# Patient Record
Sex: MALE | Race: WHITE | NOT HISPANIC OR LATINO | URBAN - METROPOLITAN AREA
[De-identification: names, ages, dates, MRNs, and addresses within clinical notes are randomized per-mention and may not be internally consistent; named-entity substitution may affect disease eponyms.]

---

## 2021-02-18 ENCOUNTER — OFFICE VISIT (OUTPATIENT)
Dept: URBAN - METROPOLITAN AREA CLINIC 52 | Facility: CLINIC | Age: 51
End: 2021-02-18

## 2021-04-12 ENCOUNTER — OFFICE VISIT (OUTPATIENT)
Dept: URBAN - METROPOLITAN AREA CLINIC 94 | Facility: CLINIC | Age: 51
End: 2021-04-12
Payer: SELF-PAY

## 2021-04-12 ENCOUNTER — WEB ENCOUNTER (OUTPATIENT)
Dept: URBAN - METROPOLITAN AREA CLINIC 94 | Facility: CLINIC | Age: 51
End: 2021-04-12

## 2021-04-12 ENCOUNTER — LAB OUTSIDE AN ENCOUNTER (OUTPATIENT)
Dept: URBAN - METROPOLITAN AREA CLINIC 94 | Facility: CLINIC | Age: 51
End: 2021-04-12

## 2021-04-12 DIAGNOSIS — K92.1 MELENA: ICD-10-CM

## 2021-04-12 PROCEDURE — 99204 OFFICE O/P NEW MOD 45 MIN: CPT | Performed by: INTERNAL MEDICINE

## 2021-04-12 RX ORDER — ALLOPURINOL 300 MG/1
1 TABLET TABLET ORAL ONCE A DAY
Status: ACTIVE | COMMUNITY

## 2021-04-12 RX ORDER — DICLOFENAC SODIUM 75 MG/1
1 TABLET TABLET, DELAYED RELEASE ORAL TWICE A DAY
Status: ACTIVE | COMMUNITY

## 2021-04-12 RX ORDER — FLECAINIDE ACETATE 100 MG/1
1 TABLET TABLET ORAL TWICE PER DAY
Status: ACTIVE | COMMUNITY

## 2021-04-12 RX ORDER — ASPIRIN 81 MG/1
1 TABLET TABLET, CHEWABLE ORAL ONCE A DAY
Status: ACTIVE | COMMUNITY

## 2021-04-12 RX ORDER — INDOMETHACIN 50 MG/1
1 CAPSULE WITH FOOD OR MILK CAPSULE ORAL TWICE A DAY
Status: ACTIVE | COMMUNITY

## 2021-04-12 RX ORDER — WARFARIN SODIUM 2.5 MG/1
1 TABLET TABLET ORAL ONCE A DAY
Status: DISCONTINUED | COMMUNITY

## 2021-04-12 RX ORDER — OMEPRAZOLE 40 MG/1
1 CAPSULE 30 MINUTES BEFORE MORNING MEAL CAPSULE, DELAYED RELEASE ORAL ONCE A DAY
Status: ACTIVE | COMMUNITY

## 2021-04-12 RX ORDER — TRAMADOL HYDROCHLORIDE 50 MG/1
1 TABLET AS NEEDED TABLET, FILM COATED ORAL EVERY 8 HOURS PRN
Status: ACTIVE | COMMUNITY

## 2021-04-12 RX ORDER — DOCUSATE SODIUM 100 MG/1
1 CAPSULE AS NEEDED CAPSULE, LIQUID FILLED ORAL ONCE A DAY
Status: ACTIVE | COMMUNITY

## 2021-04-12 RX ORDER — RABEPRAZOLE SODIUM 20 MG/1
1 TABLET TABLET, DELAYED RELEASE ORAL ONCE A DAY
Status: ACTIVE | COMMUNITY

## 2021-04-12 RX ORDER — METOPROLOL TARTRATE 100 MG/1
1 TABLET WITH FOOD TABLET, FILM COATED ORAL TWICE A DAY
Status: ACTIVE | COMMUNITY

## 2021-04-12 RX ORDER — LISINOPRIL 5 MG/1
1 TABLET TABLET ORAL ONCE A DAY
Status: ACTIVE | COMMUNITY

## 2021-04-12 NOTE — HPI-TODAY'S VISIT:
Pt presents for evaluation of intermittent melena, rectal bleeding and RLQ pain for last 6 months. Previously evaluated by Dr Jaime Webster from [facility] and underwent a colonoscopy by him in 10/2020 which reportedly showed moderate diverticulosis throughout colon and internal hemorrhoids. Symptoms persisted and pt underwent EGD by Dr Statton from general surgery in 3/2021 which reportedly showed gastritis without H Pylori. Pt is taking aciphex and omeprazole without any improvement. He takes indomethacin and diclofenac for shoulder pain.  Pain descibed as a dull intermittent discomfort localized to RLQ which gets worse with eating. Pain does not radiate. Rectal bleeding described as intermittent in nature. Color varies from bright red to black. Bleeding is intermittent. Last episode of bleeding was 2 weeks ago. Since then, pt eating very small amount of food which seems to be helping with bleeding and pain.  Pt also underwent a small bowel followthrough which showed a single diverticulum in jejunum , otherwise negative.

## 2021-04-13 LAB
A/G RATIO: 1.8
ALBUMIN: 4.4
ALKALINE PHOSPHATASE: 73
ALT (SGPT): 24
AST (SGOT): 21
BASO (ABSOLUTE): 0
BASOS: 1
BILIRUBIN, TOTAL: 0.4
BUN/CREATININE RATIO: 18
BUN: 21
CALCIUM: 9
CARBON DIOXIDE, TOTAL: 20
CHLORIDE: 102
CREATININE: 1.17
EGFR IF AFRICN AM: 82
EGFR IF NONAFRICN AM: 71
EOS (ABSOLUTE): 0.2
EOS: 3
FERRITIN, SERUM: 17
GLOBULIN, TOTAL: 2.5
GLUCOSE: 91
HEMATOCRIT: 38.6
HEMATOLOGY COMMENTS:: (no result)
HEMOGLOBIN: 13.1
IMMATURE CELLS: (no result)
IMMATURE GRANS (ABS): 0
IMMATURE GRANULOCYTES: 0
IRON BIND.CAP.(TIBC): 458
IRON SATURATION: 7
IRON: 33
LYMPHS (ABSOLUTE): 1.3
LYMPHS: 20
MCH: 33.6
MCHC: 33.9
MCV: 99
MONOCYTES(ABSOLUTE): 0.7
MONOCYTES: 12
NEUTROPHILS (ABSOLUTE): 4.1
NEUTROPHILS: 64
NRBC: (no result)
PLATELETS: 154
POTASSIUM: 5.1
PROTEIN, TOTAL: 6.9
RBC: 3.9
RDW: 12.5
SODIUM: 135
UIBC: 425
WBC: 6.4

## 2021-04-26 ENCOUNTER — OFFICE VISIT (OUTPATIENT)
Dept: URBAN - METROPOLITAN AREA CLINIC 93 | Facility: CLINIC | Age: 51
End: 2021-04-26
Payer: SELF-PAY

## 2021-04-26 DIAGNOSIS — D50.9 ANEMIA, IRON DEFICIENCY: ICD-10-CM

## 2021-04-26 PROCEDURE — 91110 GI TRC IMG INTRAL ESOPH-ILE: CPT | Performed by: INTERNAL MEDICINE

## 2021-05-10 ENCOUNTER — OFFICE VISIT (OUTPATIENT)
Dept: URBAN - METROPOLITAN AREA CLINIC 94 | Facility: CLINIC | Age: 51
End: 2021-05-10
Payer: SELF-PAY

## 2021-05-10 DIAGNOSIS — K92.1 MELENA: ICD-10-CM

## 2021-05-10 PROCEDURE — 99214 OFFICE O/P EST MOD 30 MIN: CPT | Performed by: INTERNAL MEDICINE

## 2021-05-10 RX ORDER — OMEPRAZOLE 40 MG/1
1 CAPSULE 30 MINUTES BEFORE MORNING MEAL CAPSULE, DELAYED RELEASE ORAL ONCE A DAY
Status: ACTIVE | COMMUNITY

## 2021-05-10 RX ORDER — ALLOPURINOL 300 MG/1
1 TABLET TABLET ORAL ONCE A DAY
Status: ACTIVE | COMMUNITY

## 2021-05-10 RX ORDER — RABEPRAZOLE SODIUM 20 MG/1
1 TABLET TABLET, DELAYED RELEASE ORAL ONCE A DAY
Status: ACTIVE | COMMUNITY

## 2021-05-10 RX ORDER — LISINOPRIL 5 MG/1
1 TABLET TABLET ORAL ONCE A DAY
Status: ACTIVE | COMMUNITY

## 2021-05-10 RX ORDER — ASPIRIN 81 MG/1
1 TABLET TABLET, CHEWABLE ORAL ONCE A DAY
Status: ACTIVE | COMMUNITY

## 2021-05-10 RX ORDER — DOCUSATE SODIUM 100 MG/1
1 CAPSULE AS NEEDED CAPSULE, LIQUID FILLED ORAL ONCE A DAY
Status: ACTIVE | COMMUNITY

## 2021-05-10 RX ORDER — DICLOFENAC SODIUM 75 MG/1
1 TABLET TABLET, DELAYED RELEASE ORAL TWICE A DAY
Status: ACTIVE | COMMUNITY

## 2021-05-10 RX ORDER — FLECAINIDE ACETATE 100 MG/1
1 TABLET TABLET ORAL TWICE PER DAY
Status: ACTIVE | COMMUNITY

## 2021-05-10 RX ORDER — TRAMADOL HYDROCHLORIDE 50 MG/1
1 TABLET AS NEEDED TABLET, FILM COATED ORAL EVERY 8 HOURS PRN
Status: ACTIVE | COMMUNITY

## 2021-05-10 RX ORDER — METOPROLOL TARTRATE 100 MG/1
1 TABLET WITH FOOD TABLET, FILM COATED ORAL TWICE A DAY
Status: ACTIVE | COMMUNITY

## 2021-05-10 RX ORDER — INDOMETHACIN 50 MG/1
1 CAPSULE WITH FOOD OR MILK CAPSULE ORAL TWICE A DAY
Status: ACTIVE | COMMUNITY

## 2021-05-10 NOTE — HPI-TODAY'S VISIT:
Pt presents for evaluation of intermittent melena, rectal bleeding and RLQ pain for last 6 months. Previously evaluated by Dr Jaime Webster from [facility] and underwent a colonoscopy by him in 10/2020 which reportedly showed moderate diverticulosis throughout colon and internal hemorrhoids. Terminal ileum was not evaluated at that time. Symptoms persisted and pt underwent EGD by Dr Statton from general surgery in 3/2021 which reportedly showed gastritis without H Pylori. Pt was taking aciphex and omeprazole without any improvement. He takes indomethacin and diclofenac for shoulder pain.  Pain descibed as a dull intermittent discomfort localized to RLQ which gets worse with eating. Pain does not radiate. Rectal bleeding described as intermittent in nature. Color varies from bright red to black. Bleeding is intermittent. Pt underwent a small bowel followthrough which showed a single diverticulum in jejunum , otherwise negative. Pt had pill cam which showed severe erosions with inflammation throughout his small bowel. Recent labs showed iron deficieincy with Fe 17 and  normal HGB AT 13.1

## 2021-05-21 ENCOUNTER — OFFICE VISIT (OUTPATIENT)
Dept: URBAN - METROPOLITAN AREA MEDICAL CENTER 34 | Facility: MEDICAL CENTER | Age: 51
End: 2021-05-21
Payer: SELF-PAY

## 2021-05-21 DIAGNOSIS — K29.80 ACUTE DUODENITIS: ICD-10-CM

## 2021-05-21 DIAGNOSIS — K22.8 COLUMNAR-LINED ESOPHAGUS: ICD-10-CM

## 2021-05-21 DIAGNOSIS — D12.5 ADENOMA OF SIGMOID COLON: ICD-10-CM

## 2021-05-21 DIAGNOSIS — K31.89 ACQUIRED DEFORMITY OF DUODENUM: ICD-10-CM

## 2021-05-21 DIAGNOSIS — K92.1 BLACK STOOL: ICD-10-CM

## 2021-05-21 PROCEDURE — 43239 EGD BIOPSY SINGLE/MULTIPLE: CPT | Performed by: INTERNAL MEDICINE

## 2021-05-21 PROCEDURE — 45380 COLONOSCOPY AND BIOPSY: CPT | Performed by: INTERNAL MEDICINE

## 2021-06-09 ENCOUNTER — OFFICE VISIT (OUTPATIENT)
Dept: URBAN - METROPOLITAN AREA CLINIC 94 | Facility: CLINIC | Age: 51
End: 2021-06-09
Payer: SELF-PAY

## 2021-06-09 VITALS
BODY MASS INDEX: 28.71 KG/M2 | TEMPERATURE: 99.1 F | SYSTOLIC BLOOD PRESSURE: 132 MMHG | DIASTOLIC BLOOD PRESSURE: 85 MMHG | HEART RATE: 65 BPM | WEIGHT: 212 LBS | HEIGHT: 72 IN

## 2021-06-09 DIAGNOSIS — K92.1 MELENA: ICD-10-CM

## 2021-06-09 DIAGNOSIS — K57.50 DIVERTICULOSIS OF BOTH SMALL AND LARGE INTESTINE: ICD-10-CM

## 2021-06-09 DIAGNOSIS — K29.80 PEPTIC DUODENITIS: ICD-10-CM

## 2021-06-09 DIAGNOSIS — D50.0 IRON DEFICIENCY ANEMIA DUE TO CHRONIC BLOOD LOSS: ICD-10-CM

## 2021-06-09 PROBLEM — 397881000: Status: ACTIVE | Noted: 2021-06-09

## 2021-06-09 PROCEDURE — 99213 OFFICE O/P EST LOW 20 MIN: CPT | Performed by: INTERNAL MEDICINE

## 2021-06-09 RX ORDER — METOPROLOL TARTRATE 100 MG/1
1 TABLET WITH FOOD TABLET, FILM COATED ORAL TWICE A DAY
Status: ACTIVE | COMMUNITY

## 2021-06-09 RX ORDER — OMEPRAZOLE 40 MG/1
1 CAPSULE 30 MINUTES BEFORE MORNING MEAL CAPSULE, DELAYED RELEASE ORAL ONCE A DAY
Status: ACTIVE | COMMUNITY

## 2021-06-09 RX ORDER — LISINOPRIL 5 MG/1
1 TABLET TABLET ORAL ONCE A DAY
Status: ACTIVE | COMMUNITY

## 2021-06-09 RX ORDER — TRAMADOL HYDROCHLORIDE 50 MG/1
1 TABLET AS NEEDED TABLET, FILM COATED ORAL EVERY 8 HOURS PRN
Status: ACTIVE | COMMUNITY

## 2021-06-09 RX ORDER — DICLOFENAC SODIUM 75 MG/1
1 TABLET TABLET, DELAYED RELEASE ORAL TWICE A DAY
Status: DISCONTINUED | COMMUNITY

## 2021-06-09 RX ORDER — ASPIRIN 81 MG/1
1 TABLET TABLET, CHEWABLE ORAL ONCE A DAY
Status: ACTIVE | COMMUNITY

## 2021-06-09 RX ORDER — ALLOPURINOL 300 MG/1
1 TABLET TABLET ORAL ONCE A DAY
Status: ACTIVE | COMMUNITY

## 2021-06-09 RX ORDER — RABEPRAZOLE SODIUM 20 MG/1
1 TABLET TABLET, DELAYED RELEASE ORAL ONCE A DAY
Status: ACTIVE | COMMUNITY

## 2021-06-09 RX ORDER — DOCUSATE SODIUM 100 MG/1
1 CAPSULE AS NEEDED CAPSULE, LIQUID FILLED ORAL ONCE A DAY
Status: ACTIVE | COMMUNITY

## 2021-06-09 RX ORDER — FLECAINIDE ACETATE 100 MG/1
1 TABLET TABLET ORAL TWICE PER DAY
Status: ACTIVE | COMMUNITY

## 2021-06-09 RX ORDER — INDOMETHACIN 50 MG/1
1 CAPSULE WITH FOOD OR MILK CAPSULE ORAL TWICE A DAY
Status: DISCONTINUED | COMMUNITY

## 2021-06-09 NOTE — HPI-TODAY'S VISIT:
50 yo M returns today for post procedure f/u visit. EGD 5/2021 reveals irregular Z line, stomach and duodenal erythema Bx Peptic Duodenitis Reactive Gastropathy, (-) H. Pylori Colonoscopy normal TI, Multiple small and large mouth diverticula - sigmoid, 5 mm sessile serrated polyp in Sigmoid colon, IH  Pt reports stopping the Diclofenac as directed and continues with the oral iron supplement since his last visit. Pt still reports dark/black stools but is taking oral iron.   Pt had a hx of intermittent melena, rectal bleeding and RLQ pain for last 6 months. Previously evaluated by Dr Jaime Webster from [facility] and underwent a colonoscopy by him in 10/2020 which reportedly showed moderate diverticulosis throughout colon and internal hemorrhoids. Terminal ileum was not evaluated at that time. Symptoms persisted and pt underwent EGD by Dr Statton from general surgery in 3/2021 which reportedly showed gastritis without H Pylori. Pt was taking aciphex and omeprazole without any improvement.  He takes indomethacin and diclofenac for shoulder pain.  Pain descibed as a dull intermittent discomfort localized to RLQ which gets worse with eating. Pain does not radiate. Rectal bleeding described as intermittent in nature. Color varies from bright red to black. Bleeding is intermittent. Pt underwent a small bowel followthrough which showed a single diverticulum in jejunum , otherwise negative.  Pill cam which showed severe erosions with inflammation throughout his small bowel. Recent labs showed iron deficieincy with Fe 17 and  normal HGB

## 2021-06-10 LAB
BASO (ABSOLUTE): 0.1
BASOS: 1
EOS (ABSOLUTE): 0.1
EOS: 1
FERRITIN, SERUM: 35
HEMATOCRIT: 46.9
HEMATOLOGY COMMENTS:: (no result)
HEMOGLOBIN: 16.3
IMMATURE CELLS: (no result)
IMMATURE GRANS (ABS): 0
IMMATURE GRANULOCYTES: 0
IRON BIND.CAP.(TIBC): 433
IRON SATURATION: 52
IRON: 227
LYMPHS (ABSOLUTE): 1.3
LYMPHS: 18
MCH: 33
MCHC: 34.8
MCV: 95
MONOCYTES(ABSOLUTE): 0.8
MONOCYTES: 10
NEUTROPHILS (ABSOLUTE): 5.1
NEUTROPHILS: 70
NRBC: (no result)
PLATELETS: 160
RBC: 4.94
RDW: 14.2
UIBC: 206
WBC: 7.3

## 2021-09-17 ENCOUNTER — OFFICE VISIT (OUTPATIENT)
Dept: URBAN - METROPOLITAN AREA CLINIC 94 | Facility: CLINIC | Age: 51
End: 2021-09-17

## 2021-12-02 ENCOUNTER — CLAIMS CREATED FROM THE CLAIM WINDOW (OUTPATIENT)
Dept: URBAN - METROPOLITAN AREA CLINIC 94 | Facility: CLINIC | Age: 51
End: 2021-12-02
Payer: SELF-PAY

## 2021-12-02 ENCOUNTER — WEB ENCOUNTER (OUTPATIENT)
Dept: URBAN - METROPOLITAN AREA CLINIC 94 | Facility: CLINIC | Age: 51
End: 2021-12-02

## 2021-12-02 VITALS
WEIGHT: 215 LBS | DIASTOLIC BLOOD PRESSURE: 82 MMHG | HEIGHT: 72 IN | SYSTOLIC BLOOD PRESSURE: 125 MMHG | TEMPERATURE: 98.1 F | HEART RATE: 71 BPM | BODY MASS INDEX: 29.12 KG/M2

## 2021-12-02 DIAGNOSIS — R10.30 LOWER ABDOMINAL PAIN: ICD-10-CM

## 2021-12-02 DIAGNOSIS — D50.0 IRON DEFICIENCY ANEMIA DUE TO CHRONIC BLOOD LOSS: ICD-10-CM

## 2021-12-02 DIAGNOSIS — R50.9 FEVER, UNSPECIFIED FEVER CAUSE: ICD-10-CM

## 2021-12-02 PROBLEM — 724556004: Status: ACTIVE | Noted: 2021-06-09

## 2021-12-02 PROCEDURE — 99214 OFFICE O/P EST MOD 30 MIN: CPT | Performed by: INTERNAL MEDICINE

## 2021-12-02 PROCEDURE — 99214 OFFICE O/P EST MOD 30 MIN: CPT | Performed by: PHYSICIAN ASSISTANT

## 2021-12-02 RX ORDER — METRONIDAZOLE 500 MG/1
1 TABLET TABLET ORAL THREE TIMES A DAY
Qty: 42 TABLET | Refills: 0 | OUTPATIENT
Start: 2021-12-02 | End: 2021-12-16

## 2021-12-02 RX ORDER — LISINOPRIL 5 MG/1
1 TABLET TABLET ORAL ONCE A DAY
Status: ACTIVE | COMMUNITY

## 2021-12-02 RX ORDER — DOCUSATE SODIUM 100 MG/1
1 CAPSULE AS NEEDED CAPSULE, LIQUID FILLED ORAL ONCE A DAY
Status: ON HOLD | COMMUNITY

## 2021-12-02 RX ORDER — FLECAINIDE ACETATE 100 MG/1
1 TABLET TABLET ORAL TWICE PER DAY
Status: ACTIVE | COMMUNITY

## 2021-12-02 RX ORDER — CIPROFLOXACIN HYDROCHLORIDE 500 MG/1
1 TABLET TABLET, FILM COATED ORAL
Qty: 28 TABLET | Refills: 0 | OUTPATIENT
Start: 2021-12-02 | End: 2021-12-16

## 2021-12-02 RX ORDER — TRAMADOL HYDROCHLORIDE 50 MG/1
1 TABLET AS NEEDED TABLET, FILM COATED ORAL EVERY 8 HOURS PRN
Status: ACTIVE | COMMUNITY

## 2021-12-02 RX ORDER — OMEPRAZOLE 40 MG/1
1 CAPSULE 30 MINUTES BEFORE MORNING MEAL CAPSULE, DELAYED RELEASE ORAL ONCE A DAY
Status: ACTIVE | COMMUNITY

## 2021-12-02 RX ORDER — ALLOPURINOL 300 MG/1
1 TABLET TABLET ORAL ONCE A DAY
Status: ACTIVE | COMMUNITY

## 2021-12-02 RX ORDER — RABEPRAZOLE SODIUM 20 MG/1
1 TABLET TABLET, DELAYED RELEASE ORAL ONCE A DAY
Status: ACTIVE | COMMUNITY

## 2021-12-02 RX ORDER — ASPIRIN 81 MG/1
1 TABLET TABLET, CHEWABLE ORAL ONCE A DAY
Status: ACTIVE | COMMUNITY

## 2021-12-02 RX ORDER — METOPROLOL TARTRATE 100 MG/1
1 TABLET WITH FOOD TABLET, FILM COATED ORAL TWICE A DAY
Status: ACTIVE | COMMUNITY

## 2021-12-02 NOTE — HPI-TODAY'S VISIT:
50 yo M returns today for early office visit with moderate to severe lower abdominal pain.   The patient reports over the past few mos, GI sx have started to return. He reports frequency with bowel habits with only small amounts of liquid to solid stool. Then yesterday he developed sudden onset of sharp RLQ and LLQ pain. The patient felt constipated, so he came by the office and picked up prep that is used for bowel preps and took it last night. He reports having a large BM and pain improved but still present. He also reports having a low grade fever 100.4 last night. Pt states that he has seen black to dark stools routinely since his last visit. Pt almost went to ER but sx improved and did not.   EGD 5/2021 reveals irregular Z line, stomach and duodenal erythema Bx Peptic Duodenitis Reactive Gastropathy, (-) H. Pylori   EGD Dr Statton  3/2021 which reportedly showed gastritis without H Pylori.   Colonoscopy 5/2021 normal TI, Multiple small and large mouth diverticula - sigmoid, 5 mm sessile serrated polyp in Sigmoid colon, IH  Colonoscopy 10/2020 DR Jaime Webster reportedly showed moderate diverticulosis throughout colon and internal hemorrhoids. Terminal ileum was not evaluated at that time.   SB  follow through which showed a single diverticulum in jejunum, otherwise negative.  Pill cam revealed severe erosions with inflammation throughout his small bowel.

## 2021-12-02 NOTE — PHYSICAL EXAM GASTROINTESTINAL
Abdomen , soft, RLQ and LLQ tender, nondistended , no guarding or rigidity , no masses palpable , normal bowel sounds , Liver and Spleen , no hepatomegaly present , no hepatosplenomegaly , liver nontender , spleen not palpable

## 2021-12-03 ENCOUNTER — TELEPHONE ENCOUNTER (OUTPATIENT)
Dept: URBAN - METROPOLITAN AREA CLINIC 92 | Facility: CLINIC | Age: 51
End: 2021-12-03

## 2021-12-06 LAB
ACCA: 26
ALCA: 19
AMCA: 13
ATYPICAL PANCA: NEGATIVE
BANDS: (no result)
BASO (ABSOLUTE): 0
BASOS: 0
BLASTS/BLAST LIKE CELLS: (no result)
C-REACTIVE PROTEIN, QUANT: 74
COMMENTS: (no result)
EOS (ABSOLUTE): 0
EOS: 0
FERRITIN, SERUM: 497
GASCA: 30
HEMATOCRIT: 49.4
HEMATOLOGY COMMENTS:: (no result)
HEMOGLOBIN: 18.3
IMMATURE CELLS: (no result)
IMMATURE GRANS (ABS): (no result)
IMMATURE GRANULOCYTES: (no result)
IRON BIND.CAP.(TIBC): 338
IRON SATURATION: 58
IRON: 197
LYMPHS (ABSOLUTE): 2.2
LYMPHS: 17
MCH: 39.2
MCHC: 37
MCV: 106
MEGAKARYOCYTES: (no result)
METAMYELOCYTES: 1
MONOCYTES(ABSOLUTE): 2.1
MONOCYTES: 16
MYELOCYTES: 1
NEUTROPHILS (ABSOLUTE): 8.5
NEUTROPHILS: 65
NRBC: (no result)
OTHER, LINEAGE UNCERTAIN: (no result)
PLATELETS: 152
PROMYELOCYTES: (no result)
RBC: 4.67
RDW: 12.3
UIBC: 141
WBC: 13

## 2022-01-13 ENCOUNTER — OFFICE VISIT (OUTPATIENT)
Dept: URBAN - METROPOLITAN AREA CLINIC 94 | Facility: CLINIC | Age: 52
End: 2022-01-13

## 2022-01-13 RX ORDER — ASPIRIN 81 MG/1
1 TABLET TABLET, CHEWABLE ORAL ONCE A DAY
Status: ACTIVE | COMMUNITY

## 2022-01-13 RX ORDER — FLECAINIDE ACETATE 100 MG/1
1 TABLET TABLET ORAL TWICE PER DAY
Status: ACTIVE | COMMUNITY

## 2022-01-13 RX ORDER — RABEPRAZOLE SODIUM 20 MG/1
1 TABLET TABLET, DELAYED RELEASE ORAL ONCE A DAY
Status: ACTIVE | COMMUNITY

## 2022-01-13 RX ORDER — LISINOPRIL 5 MG/1
1 TABLET TABLET ORAL ONCE A DAY
Status: ACTIVE | COMMUNITY

## 2022-01-13 RX ORDER — METOPROLOL TARTRATE 100 MG/1
1 TABLET WITH FOOD TABLET, FILM COATED ORAL TWICE A DAY
Status: ACTIVE | COMMUNITY

## 2022-01-13 RX ORDER — ALLOPURINOL 300 MG/1
1 TABLET TABLET ORAL ONCE A DAY
Status: ACTIVE | COMMUNITY

## 2022-01-13 RX ORDER — OMEPRAZOLE 40 MG/1
1 CAPSULE 30 MINUTES BEFORE MORNING MEAL CAPSULE, DELAYED RELEASE ORAL ONCE A DAY
Status: ACTIVE | COMMUNITY

## 2022-01-13 RX ORDER — TRAMADOL HYDROCHLORIDE 50 MG/1
1 TABLET AS NEEDED TABLET, FILM COATED ORAL EVERY 8 HOURS PRN
Status: ACTIVE | COMMUNITY

## 2022-01-13 RX ORDER — DOCUSATE SODIUM 100 MG/1
1 CAPSULE AS NEEDED CAPSULE, LIQUID FILLED ORAL ONCE A DAY
Status: ON HOLD | COMMUNITY

## 2022-03-21 ENCOUNTER — TELEPHONE ENCOUNTER (OUTPATIENT)
Dept: URBAN - METROPOLITAN AREA CLINIC 94 | Facility: CLINIC | Age: 52
End: 2022-03-21

## 2022-03-21 RX ORDER — METRONIDAZOLE 500 MG/1
1 TABLET TABLET ORAL THREE TIMES A DAY
Qty: 42 TABLET | Refills: 0
Start: 2021-12-02 | End: 2022-04-04

## 2022-03-21 RX ORDER — CIPROFLOXACIN HYDROCHLORIDE 500 MG/1
1 TABLET TABLET, FILM COATED ORAL
Qty: 28 TABLET | Refills: 0
Start: 2021-12-02 | End: 2022-04-04

## 2022-10-13 ENCOUNTER — OFFICE VISIT (OUTPATIENT)
Dept: URBAN - METROPOLITAN AREA CLINIC 94 | Facility: CLINIC | Age: 52
End: 2022-10-13
Payer: SELF-PAY

## 2022-10-13 VITALS
WEIGHT: 209 LBS | HEIGHT: 72 IN | SYSTOLIC BLOOD PRESSURE: 131 MMHG | HEART RATE: 91 BPM | TEMPERATURE: 97.5 F | DIASTOLIC BLOOD PRESSURE: 97 MMHG | BODY MASS INDEX: 28.31 KG/M2

## 2022-10-13 DIAGNOSIS — R10.31 RLQ ABDOMINAL PAIN: ICD-10-CM

## 2022-10-13 DIAGNOSIS — Z87.19 HISTORY OF DIVERTICULITIS: ICD-10-CM

## 2022-10-13 PROCEDURE — 99214 OFFICE O/P EST MOD 30 MIN: CPT | Performed by: INTERNAL MEDICINE

## 2022-10-13 RX ORDER — METRONIDAZOLE 500 MG/1
1 TABLET TABLET ORAL THREE TIMES A DAY
Qty: 42 TABLET | Refills: 0
Start: 2021-12-02 | End: 2022-10-27

## 2022-10-13 RX ORDER — METOPROLOL TARTRATE 100 MG/1
1 TABLET WITH FOOD TABLET, FILM COATED ORAL TWICE A DAY
Status: ACTIVE | COMMUNITY

## 2022-10-13 RX ORDER — CIPROFLOXACIN HYDROCHLORIDE 500 MG/1
1 TABLET TABLET, FILM COATED ORAL
Qty: 28 TABLET | Refills: 0
Start: 2021-12-02 | End: 2022-10-27

## 2022-10-13 RX ORDER — METRONIDAZOLE 500 MG/1
1 TABLET TABLET ORAL THREE TIMES A DAY
Qty: 42 TABLET | Refills: 0 | OUTPATIENT
Start: 2022-10-13 | End: 2022-10-27

## 2022-10-13 RX ORDER — TRAMADOL HYDROCHLORIDE 50 MG/1
1 TABLET AS NEEDED TABLET, FILM COATED ORAL EVERY 8 HOURS PRN
Status: ACTIVE | COMMUNITY

## 2022-10-13 RX ORDER — NITROGLYCERIN 0.4 MG/1
AS DIRECTED TABLET SUBLINGUAL
Status: ACTIVE | COMMUNITY

## 2022-10-13 RX ORDER — INDOMETHACIN 50 MG/1
1 CAPSULE WITH FOOD OR MILK CAPSULE ORAL TWICE A DAY
Status: ACTIVE | COMMUNITY

## 2022-10-13 RX ORDER — OMEPRAZOLE 40 MG/1
1 CAPSULE 30 MINUTES BEFORE MORNING MEAL CAPSULE, DELAYED RELEASE ORAL ONCE A DAY
Status: ACTIVE | COMMUNITY

## 2022-10-13 RX ORDER — LISINOPRIL 5 MG/1
1 TABLET TABLET ORAL ONCE A DAY
Status: ACTIVE | COMMUNITY

## 2022-10-13 RX ORDER — ASPIRIN 81 MG/1
1 TABLET TABLET, CHEWABLE ORAL ONCE A DAY
Status: ACTIVE | COMMUNITY

## 2022-10-13 RX ORDER — FLECAINIDE ACETATE 100 MG/1
1 TABLET TABLET ORAL TWICE PER DAY
Status: ACTIVE | COMMUNITY

## 2022-10-13 RX ORDER — RABEPRAZOLE SODIUM 20 MG/1
1 TABLET TABLET, DELAYED RELEASE ORAL ONCE A DAY
Status: ACTIVE | COMMUNITY

## 2022-10-13 RX ORDER — ALLOPURINOL 300 MG/1
1 TABLET TABLET ORAL ONCE A DAY
Status: ACTIVE | COMMUNITY

## 2022-10-13 NOTE — HPI-TODAY'S VISIT:
50 yo M returns today for early office visit with moderate to severe lower abdominal pain.   Pt reports developed RLQ pain which began last Friday. He reports hx of diverticulitis before and immediately started CLD diet. Pt reports sx have persisted and not improved. He called office yesterday for antibiotics and advised to have office visit today. Pt reports feeling like he had fever yesterday but did not take it. Pt denies melena or hematochezia. Pt denies constipation but at times will have urgency with loose stools. Pt reports an intentional wt loss over the past yr.   EGD 5/2021 reveals irregular Z line, stomach and duodenal erythema Bx Peptic Duodenitis Reactive Gastropathy, (-) H. Pylori   EGD Dr Statton  3/2021 which reportedly showed gastritis without H Pylori.   Colonoscopy 5/2021 normal TI, Multiple small and large mouth diverticula - sigmoid, 5 mm sessile serrated polyp in Sigmoid colon, IH  Colonoscopy 10/2020 DR Jaime Webster reportedly showed moderate diverticulosis throughout colon and internal hemorrhoids. Terminal ileum was not evaluated at that time.   SB  follow through which showed a single diverticulum in jejunum, otherwise negative.  Pill cam revealed severe erosions with inflammation throughout his small bowel.

## 2022-10-19 ENCOUNTER — DASHBOARD ENCOUNTERS (OUTPATIENT)
Age: 52
End: 2022-10-19

## 2022-10-27 ENCOUNTER — OFFICE VISIT (OUTPATIENT)
Dept: URBAN - METROPOLITAN AREA CLINIC 94 | Facility: CLINIC | Age: 52
End: 2022-10-27

## 2022-10-27 RX ORDER — METRONIDAZOLE 500 MG/1
1 TABLET TABLET ORAL THREE TIMES A DAY
Qty: 42 TABLET | Refills: 0 | Status: ACTIVE | COMMUNITY
Start: 2022-10-13 | End: 2022-10-27

## 2022-10-27 RX ORDER — FLECAINIDE ACETATE 100 MG/1
1 TABLET TABLET ORAL TWICE PER DAY
Status: ACTIVE | COMMUNITY

## 2022-10-27 RX ORDER — NITROGLYCERIN 0.4 MG/1
AS DIRECTED TABLET SUBLINGUAL
Status: ACTIVE | COMMUNITY

## 2022-10-27 RX ORDER — LISINOPRIL 5 MG/1
1 TABLET TABLET ORAL ONCE A DAY
Status: ACTIVE | COMMUNITY

## 2022-10-27 RX ORDER — OMEPRAZOLE 40 MG/1
1 CAPSULE 30 MINUTES BEFORE MORNING MEAL CAPSULE, DELAYED RELEASE ORAL ONCE A DAY
Status: ACTIVE | COMMUNITY

## 2022-10-27 RX ORDER — ALLOPURINOL 300 MG/1
1 TABLET TABLET ORAL ONCE A DAY
Status: ACTIVE | COMMUNITY

## 2022-10-27 RX ORDER — TRAMADOL HYDROCHLORIDE 50 MG/1
1 TABLET AS NEEDED TABLET, FILM COATED ORAL EVERY 8 HOURS PRN
Status: ACTIVE | COMMUNITY

## 2022-10-27 RX ORDER — INDOMETHACIN 50 MG/1
1 CAPSULE WITH FOOD OR MILK CAPSULE ORAL TWICE A DAY
Status: ACTIVE | COMMUNITY

## 2022-10-27 RX ORDER — RABEPRAZOLE SODIUM 20 MG/1
1 TABLET TABLET, DELAYED RELEASE ORAL ONCE A DAY
Status: ACTIVE | COMMUNITY

## 2022-10-27 RX ORDER — ASPIRIN 81 MG/1
1 TABLET TABLET, CHEWABLE ORAL ONCE A DAY
Status: ACTIVE | COMMUNITY

## 2022-10-27 RX ORDER — CIPROFLOXACIN HYDROCHLORIDE 500 MG/1
1 TABLET TABLET, FILM COATED ORAL
Qty: 28 TABLET | Refills: 0 | Status: ACTIVE | COMMUNITY
Start: 2021-12-02 | End: 2022-10-27

## 2022-10-27 RX ORDER — METOPROLOL TARTRATE 100 MG/1
1 TABLET WITH FOOD TABLET, FILM COATED ORAL TWICE A DAY
Status: ACTIVE | COMMUNITY

## 2022-10-27 RX ORDER — METRONIDAZOLE 500 MG/1
1 TABLET TABLET ORAL THREE TIMES A DAY
Qty: 42 TABLET | Refills: 0 | Status: ACTIVE | COMMUNITY
Start: 2021-12-02 | End: 2022-10-27

## 2025-06-26 ENCOUNTER — TELEPHONE ENCOUNTER (OUTPATIENT)
Dept: URBAN - METROPOLITAN AREA CLINIC 94 | Facility: CLINIC | Age: 55
End: 2025-06-26